# Patient Record
(demographics unavailable — no encounter records)

---

## 2025-04-09 NOTE — HISTORY OF PRESENT ILLNESS
[FreeTextEntry1] : Patient is a 30 year old female, referred by Dr. Velazquez, presenting with chronic low back pain x 3 years.  The pain initially started a day after a gym session involving weight training, though the specific exercise that triggered it is unclear. The pain is predominantly on the right side and occasionally radiates to the right buttock area and the lateral side of the leg. About a month ago, the patient experienced a significant exacerbation while bending over to  shoes after a gym session. This led to a severe episode where the patient was unable to walk and required assistance for basic activities. The patient visited the ER and was prescribed tramadol, valium, tylenol, and corticosteroids for a 5-day course. The pain has improved since then with physical therapy, but still persists. Sitting aggravates the pain the most, while lying down provides some relief. The patient can now walk for about an hour without significant discomfort. There is no reported weakness in the legs, numbness, tingling in the feet, or any urinary or bowel incontinence.     [Back Pain] : back pain [7] : a current pain level of 7/10

## 2025-04-09 NOTE — PHYSICAL EXAM
[Normal muscle bulk without asymmetry] : normal muscle bulk without asymmetry [Spinous Process Tenderness] : spinous process tenderness [Facet Tenderness] : facet tenderness [Normal] : Gait: normal [SLR] : positive straight leg raise [Lopez's Test] : positive Lopez's Test [LE] : 5/5 motor strength in bilateral lower extremities [] : Sensory: [L4-RT] : L4 [L5-RT] : L5

## 2025-04-09 NOTE — ASSESSMENT
[FreeTextEntry1] : Patient is a 30 year old female, referred by Dr. Velazquez, presenting with chronic low back pain x 3 years.  The pain initially started a day after a gym session involving weight training, though the specific exercise that triggered it is unclear. The pain is predominantly on the right side and occasionally radiates to the right buttock area and the lateral side of the leg. About a month ago, the patient experienced a significant exacerbation while bending over to  shoes after a gym session. This led to a severe episode where the patient was unable to walk and required assistance for basic activities. The patient visited the ER and was prescribed tramadol, valium, tylenol, and corticosteroids for a 5-day course. The pain has improved since then with physical therapy, but still persists. Sitting aggravates the pain the most, while lying down provides some relief. The patient can now walk for about an hour without significant discomfort. There is no reported weakness in the legs, numbness, tingling in the feet, or any urinary or bowel incontinence.   Plan: - Recommend LESI L4-L5 The potential benefits as well as rare but possible risks were reviewed with the patient.  These risks including infection including epidural abscess, meningitis, osteomyelitis, and discitis, bleeding including epidural hematoma, nerve injury, paralysis, failure to relieve pain or worse pain, headache, pneumothorax, elevated blood sugars, allergic reactions, adverse reactions to medications, vasovagal reactions, falls, etc. Following that discussion, we decided together that the potential benefits outweigh the potential risks and we decided to proceed with scheduling the procedure.  I answered all the patient's questions about the procedure including the technical details of the procedure and also offered that if any other questions arise, we will also be happy to answer those on the day of the procedure. All questions today were answered to the patient's satisfaction, and the patient stated their verbal understanding. - Follow upin 2 weeks

## 2025-04-09 NOTE — DATA REVIEWED
[FreeTextEntry1] : Date of Exam: 03-  @[facility] EXAM: MRI LUMBAR SPINE WITHOUT CONTRAST  HISTORY: Right lower back pain.  TECHNIQUE:  Multiplanar multisequence MRI lumbar spine was performed without intravenous contrast. Scanner:  [id number]  FINDINGS:   There is degenerative loss of disc signal with disc space narrowing at L4-5. No significant focal bone marrow edema identified. No compression fracture. No significant spondylolisthesis. There is a lumbar levoscoliosis.  The conus medullaris appears normal in size and is located at the T12 level.   The paraspinal soft tissues are unremarkable.  There is a partially imaged left adnexal cyst which is estimated at 3 cm.  Evaluation of individual lumbar levels demonstrates: L1-2: No significant spinal canal or neuroforaminal stenosis. L2-3: No significant spinal canal or neuroforaminal stenosis. L3-4: No significant spinal canal or neuroforaminal stenosis. L4-5: Disc bulge. Superimposed shallow broad central disc protrusion. Mild spinal canal stenosis. No significant neuroforaminal narrowing. 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 No significant spinal canal or neuroforaminal stenosis.   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 Scoliosis. 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  Partially imaged left adnexal cyst. Further evaluation with pelvic sonography can be performed as clinically warranted.

## 2025-04-23 NOTE — PHYSICAL EXAM
[Normal muscle bulk without asymmetry] : normal muscle bulk without asymmetry [Spinous Process Tenderness] : spinous process tenderness [Facet Tenderness] : facet tenderness [Normal] : Gait: normal [Lopez's Test] : positive Lopez's Test [LE] : 5/5 motor strength in bilateral lower extremities [] : Sensory: [L4-RT] : L4 [L5-RT] : L5

## 2025-04-24 NOTE — ASSESSMENT
[FreeTextEntry1] : Patient is a 30 year old female, referred by Dr. Velazquez, presenting with chronic low back pain x 3 years. She is here for follow up after having a LESI L4-L5 on 4/9/25.  The patient came in for a follow-up after the first epidural injection received for chronic back pain two weeks ago. She reports her right buttock and right thigh pain is resolved.  She complains of pain in the center of her low back, slightly off to the right. The pain is a 5/10 and is worse with prolonged sitting, bending, lifting, and reaching forward. The pain is improved with standing and she feels more comfortable. The patient has failed rest, activity modification, 6 weeks of physician directed physical therapy, NSAIDs, muscle relaxants, and neuropathic medications as well as epidural steroid injections.  Plan: - Recommend Intracept procedure L4 and L5 The patient is skeletally mature and has failed to respond to 6 months of supervised conservative management (including exercise, nonsteroidal and steroidal medications, multiple courses of physical therapy including both passive and active modalities, lifestyle modification).  The pain exhibited is consistent with Vertebrogenic Pain, there is evidence of (Type 1 changes at L4, L5). I have confirmed this myself on the MRI images in additional to a read by radiology.  The patient has not had previous spine surgery and has not had previous basivertebral nerve ablation. There is no evidence on MRI or flexion extension radiographs to suggest that this back pain is due to lumbar stenosis, degenerative scoliosis, spondylolisthesis, segmental instability, facet arthropathy and disc disease. The patient does not have lumbar radicular pain.  - Will submit authorization. Patient signed forms and was given brochure.

## 2025-04-24 NOTE — HISTORY OF PRESENT ILLNESS
[Back Pain] : back pain [Episodic] : episodic [5] : an average pain level of 5/10 [6] : a maximum pain level of 6/10 [Sitting] : sitting [Bending] : bending [Lifting] : lifting [FreeTextEntry1] : Patient is a 30 year old female, referred by Dr. Velazquez, presenting with chronic low back pain x 3 years. She is here for follow up after having a LESI L4-L5 on 4/9/25.  The patient came in for a follow-up after the first epidural injection received for chronic back pain two weeks ago. She reports her right buttock and right thigh pain is resolved.  She complains of pain in the center of her low back, slightly off to the right. The pain is a 5/10 and is worse with prolonged sitting, bending, lifting, and reaching forward. The pain is improved with standing and she feels more comfortable. The patient has failed rest, activity modification, 6 weeks of physician directed physical therapy, NSAIDs, muscle relaxants, and neuropathic medications as well as epidural steroid injections.